# Patient Record
Sex: FEMALE | Race: WHITE | NOT HISPANIC OR LATINO | Employment: FULL TIME | URBAN - METROPOLITAN AREA
[De-identification: names, ages, dates, MRNs, and addresses within clinical notes are randomized per-mention and may not be internally consistent; named-entity substitution may affect disease eponyms.]

---

## 2021-12-20 ENCOUNTER — HOSPITAL ENCOUNTER (EMERGENCY)
Facility: CLINIC | Age: 31
Discharge: HOME | End: 2021-12-21
Attending: EMERGENCY MEDICINE | Admitting: EMERGENCY MEDICINE
Payer: COMMERCIAL

## 2021-12-20 ENCOUNTER — NURSE TRIAGE (OUTPATIENT)
Dept: NURSING | Facility: CLINIC | Age: 31
End: 2021-12-20
Payer: COMMERCIAL

## 2021-12-20 ENCOUNTER — APPOINTMENT (OUTPATIENT)
Dept: CT IMAGING | Facility: CLINIC | Age: 31
End: 2021-12-20
Attending: EMERGENCY MEDICINE
Payer: COMMERCIAL

## 2021-12-20 DIAGNOSIS — J12.82 PNEUMONIA DUE TO 2019 NOVEL CORONAVIRUS: ICD-10-CM

## 2021-12-20 DIAGNOSIS — R09.02 HYPOXIA: ICD-10-CM

## 2021-12-20 DIAGNOSIS — U07.1 PNEUMONIA DUE TO 2019 NOVEL CORONAVIRUS: ICD-10-CM

## 2021-12-20 LAB
ANION GAP SERPL CALCULATED.3IONS-SCNC: 13 MMOL/L (ref 5–18)
APTT PPP: 35 SECONDS (ref 22–38)
BASOPHILS # BLD AUTO: 0 10E3/UL (ref 0–0.2)
BASOPHILS NFR BLD AUTO: 0 %
BNP SERPL-MCNC: <10 PG/ML (ref 0–64)
BUN SERPL-MCNC: 6 MG/DL (ref 8–22)
CALCIUM SERPL-MCNC: 8.3 MG/DL (ref 8.5–10.5)
CHLORIDE BLD-SCNC: 97 MMOL/L (ref 98–107)
CO2 SERPL-SCNC: 26 MMOL/L (ref 22–31)
CREAT SERPL-MCNC: 0.82 MG/DL (ref 0.6–1.1)
D DIMER PPP FEU-MCNC: 1.57 UG/ML FEU (ref 0–0.5)
EOSINOPHIL # BLD AUTO: 0 10E3/UL (ref 0–0.7)
EOSINOPHIL NFR BLD AUTO: 0 %
ERYTHROCYTE [DISTWIDTH] IN BLOOD BY AUTOMATED COUNT: 12.6 % (ref 10–15)
FLUAV RNA SPEC QL NAA+PROBE: NEGATIVE
FLUBV RNA RESP QL NAA+PROBE: NEGATIVE
GFR SERPL CREATININE-BSD FRML MDRD: >90 ML/MIN/1.73M2
GLUCOSE BLD-MCNC: 107 MG/DL (ref 70–125)
HCG SERPL QL: NEGATIVE
HCT VFR BLD AUTO: 37.9 % (ref 35–47)
HGB BLD-MCNC: 13.2 G/DL (ref 11.7–15.7)
IMM GRANULOCYTES # BLD: 0 10E3/UL
IMM GRANULOCYTES NFR BLD: 0 %
INR PPP: 1.04 (ref 0.85–1.15)
LACTATE SERPL-SCNC: 1 MMOL/L (ref 0.7–2)
LYMPHOCYTES # BLD AUTO: 1.7 10E3/UL (ref 0.8–5.3)
LYMPHOCYTES NFR BLD AUTO: 22 %
MCH RBC QN AUTO: 30.1 PG (ref 26.5–33)
MCHC RBC AUTO-ENTMCNC: 34.8 G/DL (ref 31.5–36.5)
MCV RBC AUTO: 86 FL (ref 78–100)
MONOCYTES # BLD AUTO: 0.4 10E3/UL (ref 0–1.3)
MONOCYTES NFR BLD AUTO: 5 %
NEUTROPHILS # BLD AUTO: 5.4 10E3/UL (ref 1.6–8.3)
NEUTROPHILS NFR BLD AUTO: 73 %
NRBC # BLD AUTO: 0 10E3/UL
NRBC BLD AUTO-RTO: 0 /100
PLATELET # BLD AUTO: 202 10E3/UL (ref 150–450)
POTASSIUM BLD-SCNC: 3.5 MMOL/L (ref 3.5–5)
RBC # BLD AUTO: 4.39 10E6/UL (ref 3.8–5.2)
SARS-COV-2 RNA RESP QL NAA+PROBE: POSITIVE
SODIUM SERPL-SCNC: 136 MMOL/L (ref 136–145)
TROPONIN I SERPL-MCNC: <0.01 NG/ML (ref 0–0.29)
WBC # BLD AUTO: 7.4 10E3/UL (ref 4–11)

## 2021-12-20 PROCEDURE — 85730 THROMBOPLASTIN TIME PARTIAL: CPT | Performed by: EMERGENCY MEDICINE

## 2021-12-20 PROCEDURE — 83880 ASSAY OF NATRIURETIC PEPTIDE: CPT | Performed by: EMERGENCY MEDICINE

## 2021-12-20 PROCEDURE — 36415 COLL VENOUS BLD VENIPUNCTURE: CPT | Performed by: EMERGENCY MEDICINE

## 2021-12-20 PROCEDURE — C9803 HOPD COVID-19 SPEC COLLECT: HCPCS

## 2021-12-20 PROCEDURE — 99285 EMERGENCY DEPT VISIT HI MDM: CPT | Mod: 25

## 2021-12-20 PROCEDURE — 85379 FIBRIN DEGRADATION QUANT: CPT | Performed by: EMERGENCY MEDICINE

## 2021-12-20 PROCEDURE — 96374 THER/PROPH/DIAG INJ IV PUSH: CPT | Mod: 59

## 2021-12-20 PROCEDURE — 250N000013 HC RX MED GY IP 250 OP 250 PS 637: Performed by: EMERGENCY MEDICINE

## 2021-12-20 PROCEDURE — 85610 PROTHROMBIN TIME: CPT | Performed by: EMERGENCY MEDICINE

## 2021-12-20 PROCEDURE — 83605 ASSAY OF LACTIC ACID: CPT | Performed by: EMERGENCY MEDICINE

## 2021-12-20 PROCEDURE — 80048 BASIC METABOLIC PNL TOTAL CA: CPT | Performed by: EMERGENCY MEDICINE

## 2021-12-20 PROCEDURE — 71275 CT ANGIOGRAPHY CHEST: CPT

## 2021-12-20 PROCEDURE — 85025 COMPLETE CBC W/AUTO DIFF WBC: CPT | Performed by: EMERGENCY MEDICINE

## 2021-12-20 PROCEDURE — 84484 ASSAY OF TROPONIN QUANT: CPT | Performed by: EMERGENCY MEDICINE

## 2021-12-20 PROCEDURE — 84703 CHORIONIC GONADOTROPIN ASSAY: CPT | Performed by: EMERGENCY MEDICINE

## 2021-12-20 PROCEDURE — 250N000011 HC RX IP 250 OP 636: Performed by: EMERGENCY MEDICINE

## 2021-12-20 PROCEDURE — 87636 SARSCOV2 & INF A&B AMP PRB: CPT | Performed by: EMERGENCY MEDICINE

## 2021-12-20 RX ORDER — IOPAMIDOL 755 MG/ML
100 INJECTION, SOLUTION INTRAVASCULAR ONCE
Status: COMPLETED | OUTPATIENT
Start: 2021-12-21 | End: 2021-12-20

## 2021-12-20 RX ORDER — IBUPROFEN 400 MG/1
400 TABLET, FILM COATED ORAL ONCE
Status: COMPLETED | OUTPATIENT
Start: 2021-12-20 | End: 2021-12-20

## 2021-12-20 RX ORDER — ACETAMINOPHEN 325 MG/1
650 TABLET ORAL ONCE
Status: COMPLETED | OUTPATIENT
Start: 2021-12-20 | End: 2021-12-20

## 2021-12-20 RX ADMIN — ACETAMINOPHEN 650 MG: 325 TABLET ORAL at 23:33

## 2021-12-20 RX ADMIN — IOPAMIDOL 100 ML: 755 INJECTION, SOLUTION INTRAVENOUS at 23:55

## 2021-12-20 RX ADMIN — IBUPROFEN 400 MG: 400 TABLET ORAL at 23:33

## 2021-12-21 VITALS
DIASTOLIC BLOOD PRESSURE: 76 MMHG | OXYGEN SATURATION: 96 % | SYSTOLIC BLOOD PRESSURE: 121 MMHG | TEMPERATURE: 100.1 F | HEART RATE: 103 BPM | RESPIRATION RATE: 27 BRPM

## 2021-12-21 LAB
ATRIAL RATE - MUSE: 117 BPM
DIASTOLIC BLOOD PRESSURE - MUSE: NORMAL MMHG
INTERPRETATION ECG - MUSE: NORMAL
P AXIS - MUSE: 55 DEGREES
PR INTERVAL - MUSE: 124 MS
QRS DURATION - MUSE: 78 MS
QT - MUSE: 312 MS
QTC - MUSE: 435 MS
R AXIS - MUSE: 28 DEGREES
SYSTOLIC BLOOD PRESSURE - MUSE: NORMAL MMHG
T AXIS - MUSE: 30 DEGREES
VENTRICULAR RATE- MUSE: 117 BPM

## 2021-12-21 PROCEDURE — 250N000011 HC RX IP 250 OP 636: Performed by: EMERGENCY MEDICINE

## 2021-12-21 RX ORDER — DEXAMETHASONE SODIUM PHOSPHATE 10 MG/ML
6 INJECTION, SOLUTION INTRAMUSCULAR; INTRAVENOUS ONCE
Status: COMPLETED | OUTPATIENT
Start: 2021-12-21 | End: 2021-12-21

## 2021-12-21 RX ORDER — DEXAMETHASONE 6 MG/1
6 TABLET ORAL DAILY
Qty: 9 TABLET | Refills: 0 | Status: SHIPPED | OUTPATIENT
Start: 2021-12-21

## 2021-12-21 RX ADMIN — DEXAMETHASONE SODIUM PHOSPHATE 6 MG: 10 INJECTION, SOLUTION INTRAMUSCULAR; INTRAVENOUS at 00:34

## 2021-12-21 NOTE — TELEPHONE ENCOUNTER
Patient tested positive for Covid 12-13-21 but has had symptoms since 12-11-21.  Continues to have fevers 103 to 104 Tympanic, oxymetry is in mid 85-88% with increasing dyspnea with walking.    Disposition to ER and patient agrees with plan.    Carolina Buckley RN  [facility]    COVID 19 Nurse Triage Plan/Patient Instructions    Please be aware that novel coronavirus (COVID-19) may be circulating in the community. If you develop symptoms such as fever, cough, or SOB or if you have concerns about the presence of another infection including coronavirus (COVID-19), please contact your health care provider or visit [email or web address].     Disposition/Instructions    ED Visit recommended. Follow protocol based instructions.     Bring Your Own Device:  Please also bring your smart device(s) (smart phones, tablets, laptops) and their charging cables for your personal use and to communicate with your care team during your visit.    Thank you for taking steps to prevent the spread of this virus.  o Limit your contact with others.  o Wear a simple mask to cover your cough.  o Wash your hands well and often.    Resources    [facility]: About COVID-19: [email or web address]    CDC: What to Do If You're Sick: www.cdc.gov/coronavirus/2019-ncov/about/steps-when-sick.html    CDC: Ending Home Isolation: www.cdc.gov/coronavirus/2019-ncov/hcp/disposition-in-home-patients.html     CDC: Caring for Someone: www.cdc.gov/coronavirus/2019-ncov/if-you-are-sick/care-for-someone.html     [facility]: Interim Guidance for Hospital Discharge to Home: www.health.[facility].[place].us/diseases/coronavirus/hcp/hospdischarge.pdf    [facility] clinical trials (COVID-19 research studies): [email or web address]/umn-[email or web address]-trials     Below are the COVID-19 hotlines at the [place] Department of Health ([facility]). Interpreters are available.   o For health questions: Call 869-481-5196 or 1-380.203.5548 (7 a.m. to 7  p.m.)  o For questions about schools and childcare: Call 095-989-1744 or 1-390.599.8046 (7 a.m. to 7 p.m.)   Reason for Disposition    Oxygen level (e.g., pulse oximetry) 90 percent or lower    Additional Information    Negative: SEVERE difficulty breathing (e.g., struggling for each breath, speaks in single words)    Negative: [1] SEVERE weakness (e.g., can't stand or can barely walk) AND [2] new-onset or WORSE    Negative: Difficult to awaken or acting confused (e.g., disoriented, slurred speech)    Negative: Bluish (or gray) lips or face now    Negative: Sounds like a life-threatening emergency to the triager    Negative: [1] MODERATE difficulty breathing (e.g., speaks in phrases, SOB even at rest, pulse 100-120) AND [2] new-onset or WORSE    Negative: [1] MODERATE difficulty breathing AND [2] oxygen level (e.g., pulse oximetry) 91 to 94 percent    Protocols used: CORONAVIRUS (COVID-19) PERSISTING SYMPTOMS FOLLOW-UP CALL-A- 8.25.2021

## 2021-12-21 NOTE — ED NOTES
Writer in to see patient. Reports she is nervous. Discussed her vital signs. Denies pain. Denies shortness of breath

## 2021-12-21 NOTE — ED PROVIDER NOTES
EMERGENCY DEPARTMENT ENCOUNTER      NAME: aCssie Tobar  AGE: 31 year old female  YOB: 1990  MRN: [id number]  EVALUATION DATE & TIME: 2021 10:25 PM    PCP: No primary care provider on file.    ED PROVIDER: Vasquez Holland M.D.      Chief Complaint   Patient presents with     Fever     Shortness of Breath       FINAL IMPRESSION:  1. Pneumonia due to 2019 novel coronavirus    2. Hypoxia        ED COURSE & MEDICAL DECISION MAKIN year old female presents to the Emergency Department for evaluation of shortness of breath and Covid symptoms.  She is tachycardic and febrile when she arrives to the emergency department, setting in the upper 80s on room air.  Despite this she is able to speak full sentences and is not in any significant degree of respiratory distress.  CT negative for pulmonary embolism but does confirm bilateral infiltrates consistent with Covid pneumonia.  Inflammatory markers today and white blood cell count are not significantly elevated to raise concern for superimposed bacterial pneumonia.  Patient was given a dose of dexamethasone here.  She stabilized on 1 to 2 L of supplemental oxygen via nasal cannula.  I think at this point she would be a good candidate for discharge home with ambulatory home oxygen and close monitoring.  Home O2 set up was delivered to the emergency department for her.  We reviewed everything at length including the need for close virtual follow-up and return for any severe worsening symptoms.  Patient was comfortable with this plan.  She will be given a course of dexamethasone and does understand she needs to follow-up closely to see how she is doing.  Get well loop referral was placed for her.  She was discharged in stable condition with vital signs significantly improved.    10:35 PM Reviewed ECG.   10:36 PM I met with the patient, obtained history, performed an initial exam, and discussed options and plan for diagnostics and treatment here in the  ED.    At the conclusion of the encounter I discussed the results of all of the tests and the disposition. The questions were answered. The patient or family acknowledged understanding and was agreeable with the care plan.     I certify that this patient, Cassie Tobar has been under my care (or a nurse practitioner or physican's assistant working with me). This is the face-to-face encounter for oxygen medical necessity.      Cassie Tobar is now in a chronic stable state and continues to require supplemental oxygen. Patient has continued oxygen desaturation due to COVID19.    Alternative treatment(s) tried or considered and deemed clinically infective for treatment of COVID19 include steroids.  If portability is ordered, is the patient mobile within the home? Yes        MEDICATIONS GIVEN IN THE EMERGENCY:  Medications   ibuprofen (ADVIL/MOTRIN) tablet 400 mg (400 mg Oral Given 12/20/21 2333)   acetaminophen (TYLENOL) tablet 650 mg (650 mg Oral Given 12/20/21 2333)   iopamidol (ISOVUE-370) solution 100 mL (100 mLs Intravenous Given 12/20/21 2355)   dexamethasone PF (DECADRON) injection 6 mg (6 mg Intravenous Given 12/21/21 0034)       NEW PRESCRIPTIONS STARTED AT TODAY'S ER VISIT  New Prescriptions    DEXAMETHASONE (DECADRON) 6 MG TABLET    Take 1 tablet (6 mg) by mouth daily          =================================================================    HPI    Patient information was obtained from: Patient    Use of : N/A        Cassie Tobar is a 31 year old female with an unremarkable medical history who presents to this ED private auto for evaluation of COVID symptoms.     Patient reports onset of COVID symptoms 12/11/21. She took a rapid COVID test 12/13 which was positive. She endorses in fever, cough, generalized body aches, shortness of breath, occasional nausea with vomit (2 days ago, 12/18). Patient has been watching her O2 levels at home and today noticed increased shortness of breath with O2 sats in  the 80s on room air at rest. She has been taking tylenol and ibuprofen with mild improvement of symptoms. Patient denies chest pain, leg swelling/pain, current vomit, or any other complaints at this time.     REVIEW OF SYSTEMS   All systems reviewed and negative except as noted in HPI.    PAST MEDICAL HISTORY:  No past medical history on file.    PAST SURGICAL HISTORY:  Past Surgical History:   Procedure Laterality Date     TONSILLECTOMY       WISDOM TOOTH EXTRACTION             CURRENT MEDICATIONS:    No current facility-administered medications for this encounter.     Current Outpatient Medications   Medication     dexamethasone (DECADRON) 6 MG tablet         ALLERGIES:  No Known Allergies    FAMILY HISTORY:  No family history on file.    SOCIAL HISTORY:   Social History     Socioeconomic History     Marital status:      Spouse name: Not on file     Number of children: Not on file     Years of education: Not on file     Highest education level: Not on file   Occupational History     Not on file   Tobacco Use     Smoking status: Never Smoker     Smokeless tobacco: Not on file   Substance and Sexual Activity     Alcohol use: Not on file     Drug use: Not on file     Sexual activity: Not on file   Other Topics Concern     Not on file   Social History Narrative     Not on file     Social Determinants of Health     Financial Resource Strain: Not on file   Food Insecurity: Not on file   Transportation Needs: Not on file   Physical Activity: Not on file   Stress: Not on file   Social Connections: Not on file   Intimate Partner Violence: Not on file   Housing Stability: Not on file       VITALS:  /69   Pulse 103   Temp 100.1  F (37.8  C) (Oral)   Resp 27   LMP 11/30/2021   SpO2 95%     PHYSICAL EXAM    Constitutional: Obese young female patient, sitting up in bed, mildly diaphoretic, nontoxic-appearing  HENT: Normocephalic, Atraumatic. Neck Supple.  Eyes: EOMI, Conjunctiva normal.  Respiratory: Breathing  is unlabored on 2 L of supplemental oxygen via nasal cannula.  Lungs are coarse bilaterally.  Good air movement.  Able to speak full sentences.  Cardiovascular: Normal heart rate, Regular rhythm. No peripheral edema.  Abdomen: Soft  Musculoskeletal: Good range of motion in all major joints. No major deformities noted.  Integument: Warm, Dry.  Neurologic: Alert & awake, Normal motor function, Normal sensory function, No focal deficits noted.   Psychiatric: Cooperative. Affect appropriate.     LAB:  All pertinent labs reviewed and interpreted.  Labs Ordered and Resulted from Time of ED Arrival to Time of ED Departure   D DIMER QUANTITATIVE - Abnormal       Result Value    D-Dimer Quantitative 1.57 (*)    BASIC METABOLIC PANEL - Abnormal    Sodium 136      Potassium 3.5      Chloride 97 (*)     Carbon Dioxide (CO2) 26      Anion Gap 13      Urea Nitrogen 6 (*)     Creatinine 0.82      Calcium 8.3 (*)     Glucose 107      GFR Estimate >90     INFLUENZA A/B & SARS-COV2 PCR MULTIPLEX - Abnormal    Influenza A PCR Negative      Influenza B PCR Negative      SARS CoV2 PCR Positive (*)    INR - Normal    INR 1.04     PARTIAL THROMBOPLASTIN TIME - Normal    aPTT 35     LACTIC ACID WHOLE BLOOD - Normal    Lactic Acid 1.0     TROPONIN I - Normal    Troponin I <0.01     B-TYPE NATRIURETIC PEPTIDE ([facility] ONLY) - Normal    BNP <10     HCG QUALITATIVE PREGNANCY - Normal    hCG Serum Qualitative Negative     CBC WITH PLATELETS AND DIFFERENTIAL    WBC Count 7.4      RBC Count 4.39      Hemoglobin 13.2      Hematocrit 37.9      MCV 86      MCH 30.1      MCHC 34.8      RDW 12.6      Platelet Count 202      % Neutrophils 73      % Lymphocytes 22      % Monocytes 5      % Eosinophils 0      % Basophils 0      % Immature Granulocytes 0      NRBCs per 100 WBC 0      Absolute Neutrophils 5.4      Absolute Lymphocytes 1.7      Absolute Monocytes 0.4      Absolute Eosinophils 0.0      Absolute Basophils 0.0      Absolute Immature  Granulocytes 0.0      Absolute NRBCs 0.0         RADIOLOGY:  Reviewed all pertinent imaging. Please see official radiology report.  CT Chest Pulmonary Embolism w Contrast   Final Result   IMPRESSION:   1.  Bilateral multifocal pulmonary infiltrates consistent with COVID-19 pneumonia.   2.  No evidence of pulmonary embolus on this somewhat limited exam. No aortic aneurysm or dissection.          EKG:    Performed at: 22:33:33    Impression: Sinus tachycardia. Cannot rule out anterior infarct, age undetermined. Abnormal ECG.     Rate: 117  Rhythm: Sinus tachycardia  Axis: 28  [place] Interval: 124  QRS Interval: 78  QTc Interval: 435  ST Changes: None  Comparison: No previous ECGs available.     I have independently reviewed and interpreted the EKG(s) documented above.          I, Kendra Ortega, am serving as a scribe to document services personally performed by Dr. Vasquez Holland, based on my observation and the provider's statements to me. I, Vasquez Holland MD attest that Kendra Ortega is acting in a scribe capacity, has observed my performance of the services and has documented them in accordance with my direction.    Vasquez Holland M.D.  Emergency Medicine  [facility] EMERGENCY ROOM  [address] [id number] [phone]  Dept: [phone]       Vasquez Holland MD  12/21/21 0156

## 2021-12-21 NOTE — ED TRIAGE NOTES
Patient is here with covid dx last Monday. She does have a fever, hypoxic, and low o2 sats. o2 sats in triage are 86%. o2 NC-2L. At rest she is 93%.

## 2021-12-21 NOTE — DISCHARGE INSTRUCTIONS
You were seen today in the emergency department at [facility] for shortness of breath and low oxygen levels.  Your CT scan does confirm that you have Covid pneumonia.  Your other labs look generally stable.  You were placed on supplemental oxygen here and your breathing has stabilized.  You were given some IV steroids and will be discharging home with a course of continued dexamethasone 6 mg daily for the next 9 days.  We would like you to contact your clinic and try to set up a virtual visit within the next few days for reevaluation.  Continue to monitor your oxygen levels.  Right now you are needing 2 L of supplemental oxygen to keep your levels appropriate.  You can adjust this as needed to keep your oxygen levels greater than 92%.  If you find yourself requiring more than 4 L of oxygen you need to return to the emergency department.  The same applies if you are severely short of breath at rest, unable to eat or drink, or have any other immediate concerns.  Please continue to take Tylenol and ibuprofen every 6 hours for pain and fever.  Make sure you are drinking plenty of liquids to keep yourself hydrated.

## 2021-12-21 NOTE — ED NOTES
Portable oxygen teaching completed. Questions answered. Patient has oximeter at home. Encouraged belly sleeping and fluids.

## 2021-12-31 ENCOUNTER — VIRTUAL VISIT (OUTPATIENT)
Dept: FAMILY MEDICINE | Facility: CLINIC | Age: 31
End: 2021-12-31
Payer: COMMERCIAL

## 2021-12-31 DIAGNOSIS — U07.1 PNEUMONIA DUE TO 2019 NOVEL CORONAVIRUS: Primary | ICD-10-CM

## 2021-12-31 DIAGNOSIS — J12.82 PNEUMONIA DUE TO 2019 NOVEL CORONAVIRUS: Primary | ICD-10-CM

## 2021-12-31 PROBLEM — E66.01 MORBID OBESITY (H): Status: ACTIVE | Noted: 2021-12-31

## 2021-12-31 PROBLEM — R87.811 VAGINAL HIGH RISK HUMAN PAPILLOMAVIRUS (HPV) DNA TEST POSITIVE: Status: ACTIVE | Noted: 2021-12-31

## 2021-12-31 PROCEDURE — 99203 OFFICE O/P NEW LOW 30 MIN: CPT | Mod: GT | Performed by: STUDENT IN AN ORGANIZED HEALTH CARE EDUCATION/TRAINING PROGRAM

## 2021-12-31 RX ORDER — ASCORBIC ACID 100 MG
TABLET,CHEWABLE ORAL
COMMUNITY

## 2021-12-31 RX ORDER — CHOLECALCIFEROL (VITAMIN D3) 50 MCG
1 TABLET ORAL DAILY
COMMUNITY

## 2021-12-31 RX ORDER — ZINC GLUCONATE 50 MG
50 TABLET ORAL DAILY
COMMUNITY

## 2021-12-31 NOTE — LETTER
RETURN TO WORK/SCHOOL FORM    12/31/2021    Re: Carine Tobar  1990      To Whom It May Concern:     Carine Tobar was seen in clinic today.  She may return to work without restrictions on 1/3/22       Guy Hughes MD  12/31/2021 4:25 PM

## 2021-12-31 NOTE — PROGRESS NOTES
Carine is a 31 year old who is being evaluated via a billable video visit.      How would you like to obtain your AVS? MyChart  If the video visit is dropped, the invitation should be resent by: Text to cell phone: [phone]  Will anyone else be joining your video visit? No    Video Start Time: 4:22 PM    Assessment & Plan     1. Pneumonia due to 2019 novel coronavirus  Patient recovering well from Covid pneumonia.  Patient is on her last day of dexamethasone encourage her to finish.  She will return oxygen is no longer needing this.  I wrote a letter for her to return back to work on 1/3/2021 but discussed that if she still having a cough at that time she should wait until this is resolved.    Guy Hughes MD  [facility]   Carine is a 31 year old who presents for the following health issues     Chief Complaint   Patient presents with     Hospital F/U     [facility]. 12-20-21. covid pneumonia. wanting to know when to go back to work and when she will be better.      HPI: Patient following up from ER visit at [facility] 12/20/2021.  Presented with flulike symptoms with cough and found to have Covid pneumonia.  Placed on dexamethasone and sent home with O2.  She is no longer using this and has oxygen into the 94-95 consistently.  Does not feel short of breath and only a mild cough.  Some fatigue still.        Objective      Vitals:  No vitals were obtained today due to virtual visit.    Physical Exam   GENERAL: Healthy, alert and no distress  EYES: Eyes grossly normal to inspection.  No discharge or erythema, or obvious scleral/conjunctival abnormalities.  RESP: No audible wheeze, cough, or visible cyanosis.  No visible retractions or increased work of breathing.    SKIN: Visible skin clear. No significant rash, abnormal pigmentation or lesions.  NEURO: Cranial nerves grossly intact.  Mentation and speech appropriate for age.  PSYCH: Mentation appears normal, affect normal/bright,  judgement and insight intact, normal speech and appearance well-groomed.        Video-Visit Details    Type of service:  Video Visit    Video End Time:4:28 PM    Originating Location (pt. Location): Home    Distant Location (provider location):  [facility]     Platform used for Video Visit: [employer]ashWell

## 2022-01-30 ENCOUNTER — HEALTH MAINTENANCE LETTER (OUTPATIENT)
Age: 32
End: 2022-01-30

## 2022-09-25 ENCOUNTER — HEALTH MAINTENANCE LETTER (OUTPATIENT)
Age: 32
End: 2022-09-25

## 2023-05-13 ENCOUNTER — HEALTH MAINTENANCE LETTER (OUTPATIENT)
Age: 33
End: 2023-05-13

## 2024-07-20 ENCOUNTER — HEALTH MAINTENANCE LETTER (OUTPATIENT)
Age: 34
End: 2024-07-20